# Patient Record
Sex: FEMALE | Race: WHITE
[De-identification: names, ages, dates, MRNs, and addresses within clinical notes are randomized per-mention and may not be internally consistent; named-entity substitution may affect disease eponyms.]

---

## 2017-01-03 ENCOUNTER — HOSPITAL ENCOUNTER (EMERGENCY)
Dept: HOSPITAL 35 - ER | Age: 82
Discharge: HOME | End: 2017-01-03
Payer: COMMERCIAL

## 2017-01-03 VITALS — HEIGHT: 66 IN | BODY MASS INDEX: 22.5 KG/M2 | WEIGHT: 139.99 LBS

## 2017-01-03 VITALS — DIASTOLIC BLOOD PRESSURE: 71 MMHG | SYSTOLIC BLOOD PRESSURE: 137 MMHG

## 2017-01-03 DIAGNOSIS — Z88.6: ICD-10-CM

## 2017-01-03 DIAGNOSIS — F41.9: ICD-10-CM

## 2017-01-03 DIAGNOSIS — I26.99: ICD-10-CM

## 2017-01-03 DIAGNOSIS — Z91.040: ICD-10-CM

## 2017-01-03 DIAGNOSIS — Z90.711: ICD-10-CM

## 2017-01-03 DIAGNOSIS — R06.00: Primary | ICD-10-CM

## 2017-01-03 DIAGNOSIS — I10: ICD-10-CM

## 2017-01-03 DIAGNOSIS — Z90.49: ICD-10-CM

## 2017-01-03 DIAGNOSIS — Z91.041: ICD-10-CM

## 2017-01-03 DIAGNOSIS — Z88.1: ICD-10-CM

## 2017-01-03 DIAGNOSIS — E03.9: ICD-10-CM

## 2017-01-03 LAB
ANION GAP SERPL CALC-SCNC: 8 MMOL/L (ref 7–16)
BASOPHILS NFR BLD AUTO: 2 % (ref 0–2)
BUN SERPL-MCNC: 15 MG/DL (ref 7–18)
BURR CELLS BLD QL SMEAR: (no result)
CALCIUM SERPL-MCNC: 8.8 MG/DL (ref 8.5–10.1)
CHLORIDE SERPL-SCNC: 109 MMOL/L (ref 98–107)
CO2 SERPL-SCNC: 29 MMOL/L (ref 21–32)
CREAT SERPL-MCNC: 0.7 MG/DL (ref 0.6–1.3)
EOSINOPHIL NFR BLD: 2 % (ref 0–3)
ERYTHROCYTE [DISTWIDTH] IN BLOOD BY AUTOMATED COUNT: 12.9 % (ref 10.5–14.5)
GLUCOSE SERPL-MCNC: 111 MG/DL (ref 70–99)
GRANULOCYTES NFR BLD MANUAL: 50 % (ref 36–66)
HCT VFR BLD CALC: 41 % (ref 37–47)
HGB BLD-MCNC: 13.7 GM/DL (ref 12–15)
LG PLATELETS BLD QL SMEAR: (no result)
LYMPHOCYTES NFR BLD AUTO: 37 % (ref 24–44)
MANUAL DIFFERENTIAL PERFORMED BLD QL: YES
MCH RBC QN AUTO: 28.3 PG (ref 26–34)
MCHC RBC AUTO-ENTMCNC: 33.3 % (ref 28–37)
MCV RBC: 85.1 FL (ref 80–100)
MONOCYTES NFR BLD: 9 % (ref 1–8)
NEUTROPHILS # BLD: 3.3 THOU/UL (ref 1.4–8.2)
NT-PRO BRAIN NAT PEPTIDE: 221 PG/ML (ref ?–300)
OVALOCYTES BLD QL SMEAR: (no result)
PLATELET # BLD: 286 THOU/UL (ref 150–400)
POTASSIUM SERPL-SCNC: 3.6 MMOL/L (ref 3.5–5.1)
RBC # BLD AUTO: 4.82 MIL/UL (ref 4.2–5)
SODIUM SERPL-SCNC: 146 MMOL/L (ref 136–145)
TOTAL CELL COUNT: 100
TROPONIN I SERPL-MCNC: < 0.04 NG/ML
WBC # BLD AUTO: 6.6 THOU/UL (ref 4–11)

## 2017-01-09 ENCOUNTER — HOSPITAL ENCOUNTER (OUTPATIENT)
Dept: HOSPITAL 61 - PCVCCLINIC | Age: 82
End: 2017-01-09
Attending: INTERNAL MEDICINE
Payer: MEDICARE

## 2017-01-09 DIAGNOSIS — I10: Primary | ICD-10-CM

## 2017-01-09 DIAGNOSIS — E78.5: ICD-10-CM

## 2017-01-09 DIAGNOSIS — F41.9: ICD-10-CM

## 2017-01-09 DIAGNOSIS — I49.3: ICD-10-CM

## 2017-01-09 DIAGNOSIS — I49.1: ICD-10-CM

## 2017-01-09 PROCEDURE — 93005 ELECTROCARDIOGRAM TRACING: CPT

## 2017-01-09 PROCEDURE — 80061 LIPID PANEL: CPT

## 2017-01-09 PROCEDURE — G0463 HOSPITAL OUTPT CLINIC VISIT: HCPCS

## 2017-02-10 ENCOUNTER — HOSPITAL ENCOUNTER (OUTPATIENT)
Dept: HOSPITAL 61 - PCVCIMAG | Age: 82
Discharge: HOME | End: 2017-02-10
Attending: INTERNAL MEDICINE
Payer: MEDICARE

## 2017-02-10 DIAGNOSIS — I08.3: Primary | ICD-10-CM

## 2017-02-10 DIAGNOSIS — R06.00: ICD-10-CM

## 2017-02-10 DIAGNOSIS — R00.2: ICD-10-CM

## 2017-02-10 PROCEDURE — 93306 TTE W/DOPPLER COMPLETE: CPT

## 2017-02-21 ENCOUNTER — HOSPITAL ENCOUNTER (OUTPATIENT)
Dept: HOSPITAL 61 - PCVCCLINIC | Age: 82
Discharge: HOME | End: 2017-02-21
Attending: INTERNAL MEDICINE
Payer: MEDICARE

## 2017-02-21 DIAGNOSIS — I49.3: ICD-10-CM

## 2017-02-21 DIAGNOSIS — I10: Primary | ICD-10-CM

## 2017-02-21 DIAGNOSIS — F41.9: ICD-10-CM

## 2017-02-21 DIAGNOSIS — E78.5: ICD-10-CM

## 2017-02-21 DIAGNOSIS — I49.1: ICD-10-CM

## 2017-02-21 PROCEDURE — G0463 HOSPITAL OUTPT CLINIC VISIT: HCPCS

## 2017-02-21 PROCEDURE — 93005 ELECTROCARDIOGRAM TRACING: CPT

## 2017-04-20 ENCOUNTER — HOSPITAL ENCOUNTER (OUTPATIENT)
Dept: HOSPITAL 61 - PCVCCLINIC | Age: 82
Discharge: HOME | End: 2017-04-20
Attending: INTERNAL MEDICINE
Payer: MEDICARE

## 2017-04-20 DIAGNOSIS — F41.9: ICD-10-CM

## 2017-04-20 DIAGNOSIS — R42: ICD-10-CM

## 2017-04-20 DIAGNOSIS — R00.2: ICD-10-CM

## 2017-04-20 DIAGNOSIS — I10: Primary | ICD-10-CM

## 2017-04-20 DIAGNOSIS — I49.3: ICD-10-CM

## 2017-04-20 DIAGNOSIS — E78.5: ICD-10-CM

## 2017-04-20 PROCEDURE — 93005 ELECTROCARDIOGRAM TRACING: CPT

## 2017-04-20 PROCEDURE — G0463 HOSPITAL OUTPT CLINIC VISIT: HCPCS

## 2017-10-04 ENCOUNTER — HOSPITAL ENCOUNTER (OUTPATIENT)
Dept: HOSPITAL 61 - PCVCCLINIC | Age: 82
Discharge: HOME | End: 2017-10-04
Attending: INTERNAL MEDICINE
Payer: MEDICARE

## 2017-10-04 DIAGNOSIS — Z79.899: ICD-10-CM

## 2017-10-04 DIAGNOSIS — F41.1: ICD-10-CM

## 2017-10-04 DIAGNOSIS — Z90.49: ICD-10-CM

## 2017-10-04 DIAGNOSIS — Z88.8: ICD-10-CM

## 2017-10-04 DIAGNOSIS — I49.1: Primary | ICD-10-CM

## 2017-10-04 DIAGNOSIS — E78.2: ICD-10-CM

## 2017-10-04 DIAGNOSIS — I49.3: ICD-10-CM

## 2017-10-04 DIAGNOSIS — I10: ICD-10-CM

## 2017-10-04 DIAGNOSIS — Z90.710: ICD-10-CM

## 2017-10-04 PROCEDURE — G0463 HOSPITAL OUTPT CLINIC VISIT: HCPCS

## 2017-10-04 PROCEDURE — 80061 LIPID PANEL: CPT

## 2017-10-04 PROCEDURE — 93005 ELECTROCARDIOGRAM TRACING: CPT

## 2018-02-19 ENCOUNTER — HOSPITAL ENCOUNTER (OUTPATIENT)
Dept: HOSPITAL 61 - PCVCCLINIC | Age: 83
Discharge: HOME | End: 2018-02-19
Attending: INTERNAL MEDICINE
Payer: MEDICARE

## 2018-02-19 DIAGNOSIS — I49.3: ICD-10-CM

## 2018-02-19 DIAGNOSIS — E78.2: ICD-10-CM

## 2018-02-19 DIAGNOSIS — Z79.899: ICD-10-CM

## 2018-02-19 DIAGNOSIS — I49.1: ICD-10-CM

## 2018-02-19 DIAGNOSIS — F41.1: ICD-10-CM

## 2018-02-19 DIAGNOSIS — I10: Primary | ICD-10-CM

## 2018-02-19 PROCEDURE — 93005 ELECTROCARDIOGRAM TRACING: CPT

## 2018-08-08 ENCOUNTER — HOSPITAL ENCOUNTER (OUTPATIENT)
Dept: HOSPITAL 61 - PCVCCLINIC | Age: 83
Discharge: HOME | End: 2018-08-08
Attending: INTERNAL MEDICINE
Payer: MEDICARE

## 2018-08-08 DIAGNOSIS — F41.1: ICD-10-CM

## 2018-08-08 DIAGNOSIS — E78.5: ICD-10-CM

## 2018-08-08 DIAGNOSIS — I10: Primary | ICD-10-CM

## 2018-08-08 DIAGNOSIS — I49.3: ICD-10-CM

## 2018-08-08 PROCEDURE — 80061 LIPID PANEL: CPT

## 2018-08-08 PROCEDURE — 93005 ELECTROCARDIOGRAM TRACING: CPT

## 2019-01-07 ENCOUNTER — HOSPITAL ENCOUNTER (OUTPATIENT)
Dept: HOSPITAL 61 - PCVCCLINIC | Age: 84
Discharge: HOME | End: 2019-01-07
Attending: INTERNAL MEDICINE
Payer: MEDICARE

## 2019-01-07 DIAGNOSIS — Z79.899: ICD-10-CM

## 2019-01-07 DIAGNOSIS — F41.1: ICD-10-CM

## 2019-01-07 DIAGNOSIS — I49.3: ICD-10-CM

## 2019-01-07 DIAGNOSIS — E78.5: ICD-10-CM

## 2019-01-07 DIAGNOSIS — I10: Primary | ICD-10-CM

## 2019-01-07 DIAGNOSIS — Z88.8: ICD-10-CM

## 2019-01-07 PROCEDURE — 36415 COLL VENOUS BLD VENIPUNCTURE: CPT

## 2019-01-07 PROCEDURE — G0463 HOSPITAL OUTPT CLINIC VISIT: HCPCS

## 2019-01-07 PROCEDURE — 93005 ELECTROCARDIOGRAM TRACING: CPT

## 2019-01-07 PROCEDURE — 80061 LIPID PANEL: CPT

## 2019-02-05 ENCOUNTER — HOSPITAL ENCOUNTER (OUTPATIENT)
Dept: HOSPITAL 61 - PCVCIMAG | Age: 84
Discharge: HOME | End: 2019-02-05
Attending: INTERNAL MEDICINE
Payer: MEDICARE

## 2019-02-05 DIAGNOSIS — I49.3: ICD-10-CM

## 2019-02-05 DIAGNOSIS — I10: ICD-10-CM

## 2019-02-05 DIAGNOSIS — I08.3: Primary | ICD-10-CM

## 2019-02-05 DIAGNOSIS — E78.2: ICD-10-CM

## 2019-02-05 PROCEDURE — 93306 TTE W/DOPPLER COMPLETE: CPT

## 2019-02-05 NOTE — PCVCIMAG
--------------- APPROVED REPORT --------------





Study performed:  2019 09:43:46



EXAM: Comprehensive 2D, Doppler, and color-flow 

Echocardiogram

Patient Location: Echo lab

Status:  routine



BSA:         1.72

HR: 71 bpmBP:          130/76 mmHg

Rhythm: NSR



Other Information 

Study Quality: Adequate



Risk Factors: 

Cardiac Risk Factors:  HTN, Hyperlipidemia



Indications

PVCs



2D Dimensions

IVSd:  8.53 (7-11mm)

LVDd:  42.74 mm

PWd:  7.75 (7-11mm)

LVDs:  31.40 (25-40mm)

Left Atrium:  34.60 (27-40mm)

Aortic Root:  28.56 mm

LV Single Plane 4CH:  64.61 %

LV Single Plane 2CH:  61.85 %

Biplane EF:  64.1 %



Volumes

Left Atrial Volume (Systole)

Single Plane 4CH:  38.59 mLSingle Plane 2CH:  36.93 mL

LA ESV Index:  24.00 mL/m2



Aortic Valve

AoV Peak Tan.:  1.29 m/s

AO Peak Gr.:  6.66 mmHgLVOT Max P.02 mmHg

LVOT Max V:  1.12 m/s



Mitral Valve

E/A Ratio:  1.3

MV Decel. Time:  197.96 ms

MV E Max Tan.:  0.64 m/s

MV A Tan.:  0.49 m/s

MV PHT:  57.41 ms

IVRT:  107.27 ms



Pulmonary Valve

PV Peak Tan.:  0.86 m/sPV Peak Gr.:  2.99 mmHg



Pulmonary Vein

P Vein S:    0.19 m/sP Vein A:  0.29 m/s

P Vein D:   0.29 m/sP Vein A Dur.:  90.0 msec

P Vein S/D Ratio:  0.66



Tricuspid Valve

TR Peak Tan.:  2.49 m/s

TR Peak Gr.:  24.77 mmHg

TV Vmax:  0.45 m/s



Left Ventricle

The left ventricle is normal size. There is normal LV segmental wall 

motion. There is normal left ventricular wall thickness. Left 

ventricular systolic function is normal. The left ventricular 

ejection fraction is within the normal range. LVEF is 60-65%. Grade I 

- abnormal relaxation pattern.



Right Ventricle

The right ventricle is normal size. The right ventricular systolic 

function is normal.



Atria

The left atrium size is normal. The right atrium size is 

normal.



Aortic Valve

The aortic valve is mildly sclerotic. Mild aortic regurgitation. 

There is no aortic valvular stenosis.



Mitral Valve

The mitral valve is normal in structure. Mild to moderate mitral 

regurgitation. No evidence of mitral valve stenosis.



Tricuspid Valve

The tricuspid valve is normal in structure. Mild tricuspid 

regurgitation with PAP of 32 mmHg.



Pulmonic Valve

The pulmonary valve is normal in structure. Trace pulmonic 

regurgitation.



Great Vessels

The aortic root is normal in size. IVC is normal in size and 

collapses >50% with inspiration.



Pericardium

There is no pericardial effusion. There is no pleural 

effusion.



<Conclusion>

Left ventricular systolic function is normal.

There is normal LV segmental wall motion.

LVEF is 60-65%. Mild diastolic dysfunction

The aortic valve is mildly sclerotic. Mild aortic regurgitation, no 

stenosis

The mitral valve is normal in structure. Mild to moderate mitral 

regurgitation.

Mild tricuspid regurgitation with pulmonary artery pressure of 32 

mmHg.

There is no pericardial effusion.

## 2019-03-21 ENCOUNTER — HOSPITAL ENCOUNTER (OUTPATIENT)
Dept: HOSPITAL 61 - PCVCCLINIC | Age: 84
Discharge: HOME | End: 2019-03-21
Attending: INTERNAL MEDICINE
Payer: MEDICARE

## 2019-03-21 DIAGNOSIS — E78.5: ICD-10-CM

## 2019-03-21 DIAGNOSIS — Z88.1: ICD-10-CM

## 2019-03-21 DIAGNOSIS — I49.3: ICD-10-CM

## 2019-03-21 DIAGNOSIS — Z88.8: ICD-10-CM

## 2019-03-21 DIAGNOSIS — F41.1: ICD-10-CM

## 2019-03-21 DIAGNOSIS — I10: Primary | ICD-10-CM

## 2019-03-21 PROCEDURE — 93005 ELECTROCARDIOGRAM TRACING: CPT

## 2019-03-21 PROCEDURE — G0463 HOSPITAL OUTPT CLINIC VISIT: HCPCS

## 2019-03-21 PROCEDURE — 36415 COLL VENOUS BLD VENIPUNCTURE: CPT
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
## 2019-07-23 NOTE — NUR
ADMITTED FROM ER UNDER 'S CARE. ADMITTED WITH UTI AND WEAKNESS. MED
REC COMPLETED AND HOME MEDS STARTED PER NP ONCALL FOR KIMMY. NO S/S ACUTE
DISTRESS NOTED REPORTED AT THIS TIME. CARE STRANSFERRED TO DAY RN AT THIS
TIME.

## 2019-07-23 NOTE — NUR
Received awake on bed. Due medications given as prescribed- able to swallow
tablet w/o difficulty. On room air. A+O. On room air. With NS at 75cc/hr,
infusing well at L FA. Falls risk- falls bundle in place. Pt assisted in ADLs.
Able to go to the bathroom with walker, gait belt and on stand by assist. Pt
seen by Dr Tijerina this morning. Pt on 2 laxatives, open her bowels 2x- witheld
laxatives as pt had soft stools, 2 episodes already since this morning.

## 2019-07-24 NOTE — NUR
Received awake on bed. Due medications given as prescribed- able to swallow
tablets w/o difficulty. A+Ox2- oriented to person and place, confused and
forgetful. Up with walker, gaitbelt and standby assist. With SL at L FA-
intact and flushing well. Falls risk- falls bundle in place. Pt seen by Dr Smith- pt mentioned that she wants to go home [facility]- Dr Smith informed;
a/w discharge orders. CM informed, will set up transport once with discharge
orders.

## 2019-07-24 NOTE — NUR
CARE TEAM INDICATED THAT PT IS MEDICALLY STABLE TO DC HOME THIS DAY. PT
INDICATED THAT SHE NEEDS TRANSPORT HOME. PT INDICATED SHE ISN'T COMFORTABLE
USING A CAB. CM REQUESTED AND RECIEVED PERMISSION TO ARRANGE TRANSPORT THROUGH
[facility]. TRANSPORT IS ARRANGED AND THEY ARRIVED AROUND
1:54PM. NO OTHER CM INTERVENTION INDICATED. CASE CLOSED.

## 2019-07-29 ENCOUNTER — HOSPITAL ENCOUNTER (OUTPATIENT)
Dept: HOSPITAL 61 - PCVCCLINIC | Age: 84
Discharge: HOME | End: 2019-07-29
Attending: INTERNAL MEDICINE
Payer: MEDICARE

## 2019-07-29 DIAGNOSIS — Z88.6: ICD-10-CM

## 2019-07-29 DIAGNOSIS — Z91.040: ICD-10-CM

## 2019-07-29 DIAGNOSIS — Z88.0: ICD-10-CM

## 2019-07-29 DIAGNOSIS — I49.3: ICD-10-CM

## 2019-07-29 DIAGNOSIS — E78.5: ICD-10-CM

## 2019-07-29 DIAGNOSIS — F41.1: ICD-10-CM

## 2019-07-29 DIAGNOSIS — I10: Primary | ICD-10-CM

## 2019-07-29 DIAGNOSIS — I65.23: ICD-10-CM

## 2019-07-29 PROCEDURE — 80061 LIPID PANEL: CPT

## 2019-07-29 PROCEDURE — 93005 ELECTROCARDIOGRAM TRACING: CPT

## 2019-07-29 PROCEDURE — G0463 HOSPITAL OUTPT CLINIC VISIT: HCPCS

## 2019-07-29 PROCEDURE — 36415 COLL VENOUS BLD VENIPUNCTURE: CPT

## 2020-11-09 NOTE — EKG
[facility]
[address]
[place]   [address]                     ELECTROCARDIOGRAM REPORT      
_______________________________________________________________________________
 
Name:       PO ROSAS           Room #:                     REG [facility]#:      [id number]                       Account #:      [id number]  
Admission:  20    Attend Phys:                          
Discharge:              Date of Birth:  [birth year]  
                                                          Report #: [id number]
                                                                    [id number]
_______________________________________________________________________________
THIS REPORT FOR:  
 
cc:  FAM - Family physician unknown
     FAM - Family physician unknown
     Gurpreet Lombardi MD [facility]                                         ~
THIS REPORT FOR:   //name//                          
 
                         [facility] ED
                                       
Test Date:    2020               Test Time:    11:37:30
Pat Name:     PO ROSAS            Department:   
Patient ID:   [id number]            Room:          
Gender:       F                        Technician:   REFUGIO
:          1933               Requested By: Salina John
Order Number: [id number] MD:   Gurpreet Lombardi
                                 Measurements
Intervals                              Axis          
Rate:         88                       P:            59
IL:           157                      QRS:          59
QRSD:         89                       T:            41
QT:           385                                    
QTc:          466                                    
                           Interpretive Statements
Sinus rhythm
Compared to ECG 2019 22:20:11
No significant changes
Electronically Signed On 2020 14:11:24 CST by Gurpreet Lombardi
[email or web address]
 
 
 
 
 
 
 
 
 
 
 
 
 
 
 
 
  <ELECTRONICALLY SIGNED>
   By: Gurpreet Lombardi MD, FACC    
  20     1411
D: 11/1137                           _____________________________________
T: 20                           Gurpreet Lombardi MD, FACC      /EPI

## 2020-11-10 NOTE — NUR
PATIENT ADMITTED TO [facility] AT 0704 TO ROOM 527-A. PATIENT ADMITTED FOR MAJOR
NEURO COGNITIVE DISORDER WITH BEHAVIORS
 
PATIENT ARRIVED IN WC AND
TRANSFERED TO BED. PATIENT ALERT, CALM AND COOPERATIVE. PATIENT ANSWERS
QUESTIONS APPROPRIATELY. NO N/O PAIN, LS CLEAR, BS ACTIVE. PATIENT CONFUSED
AND DOES NOT KNOW WHY SHE IS HER. PATIENT ORIENTED TO SELF AND IS AWARE THAT
SHE IS IN THE HOSPITAL. PATIENT TO BATHROOM USING WALKER, WITH ASSIST
X1. PATIENT VOIDED X1. SWELLING TO ANUS NOTED, PATIENT C/O DISCOMFORT TO
BOTTOM. BRRIER CREAM APPLIED. PATIENT OUT TO DAYROOM FOR BREAKFAST, WITH
PILLOW UNDER BOTTOM. PATIENT % OF MEAL. MEDICATION TAKEN WHOLE WITHOUT
DIFFICULTY. PATIENT PRESENT IN GROUP AT THIS TIME.
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
## 2020-11-10 NOTE — NUR
ABISAI and Dr. Mesa attempted to contact Pt's DPOA, Lashay Prado [phone].
Dr. mesa left a message for a call back

## 2020-11-10 NOTE — NUR
PATIENT SITTING IN DAYROOM MOST OF THE DAY. AT 1500 PATIENT AMB TO ROOM AND TO
TOILET. PATIENT NOTED WITH STOOL ON PANTS AND IN BRIEF. PATIENT UNAWARE OF BM
AS STOOL RUNS OUT. PATIENT ON TOILET AND LOOSE STOOL RUNNING OUT AS
PATIENT STANDS ABOVE TOILET WITH PATIENT MOANING. PATIENT TO BED AND CLEANED
UP WHILE OBSERVING RECTUM AREA. REDNESS AND SWELLING NOTED.
HEMORRHOIDAL OINTMENT APPLIED WITH PATIENT
LAYING ON SIDE. PATIENT REMAINS IN BED TO REST BOTTOM.
1615 PATIENT AMB TO DAYROOM, SITS IN CHAIR WITH PILLOW UNDER BOTTOM.
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
## 2020-11-11 NOTE — NUR
1045 BLADDER SCAN COMPLETED ON PATIENT WITH 866 ML ON FIRST SCAN AND 1366 ML
ON SECOND SCAN. 16 FR. MAI INSERTED VIA STERILE TECH. WITH 1950 OUT OF GERTRUDIS
COLOR URINE AND A ODOR NOTED. URINE COLLECTED FOR UA. BS CHECKED WITH HYPO
ACTIVE BS NOTED. PATIENT REMAINES IN BED.
KASHIF DELGADO COMPLETED. WRITER RECIEVED A CALL FROM RAYMOND COLLIER, PHONE
CONSENT RECIEVED. EDITH COLLIER PHONE [phone] OFFICE/ [phone] CELL
1200 PATIENT C/O OF FEELING UNEASY AS SHE STATES "I HAVE A
BAD FEELING AS IF SOMETHING IS GOING TO HAPPEN AND I DO NOT WANT TO BE ALONE".
SHE ALSO STATES "I HAVE THIS INNER FEELING THAT I HAVE LEARNED TO BE AWARE OF
BECAUSE IF I DO NOT LISTEN TO THEM THEN SOMEONE CAN DIE AS IT HAS HAPPENED
BEFORE". PATIENT SAYS THIS FEELING IS FROM WITHIN AND HARD TO EXPLAIN. PATIENT
DOES NOT WANT TO BE ALONE AND WRITER ASKED PATIENT TO COME TO DAYROOM BUT
PATIENT REFUSES TO DO SO. WILL CONTINUE TO OBSERVE

## 2020-11-11 NOTE — NUR
ASSUMED CARE OF PATIENT, PATIENT IN DAYROOM SITTING WITH PEERS. PATIENT HAD
FEW BITES OF BREAKFAST STATING "I CAN NOT SWALLOW" AFTER SOME ENCOURAGEMENT
PATIENT REFUSES TO EAT. MEDICATION TAKEN WHOLE WITH ENCOURAGEMENT. PATIENT TO
BATHROOM WITH NO RESULTS. PRESENT IN GROUP AT THIS TIME.

## 2020-11-11 NOTE — NUR
6048 PATIENT CLEANED UP AND SITS UP IN BED FOR DINNER. PATIENT GIVEN REGULAR
DIET PER DR ORDER AND PATIENT REQUEST. WRITER STEPS OUT OF ROOM TO GET A
DRINK, WHEN RETURNED PATIENT C/O CHEST PAIN, NAUSEA AND SPIT UP SCANT AMT IN
NAPKIN. CONTINUES TO C/O CHEST PAIN. VS BP-137/72 P-60-68, RESP- 16 O2 SATS
99% ON RA. DR WERNER BASILIO AND DR OHARA. ORDERS RECIEVED

## 2020-11-11 NOTE — NUR
PATIENT CLEANED UP X3 IN A 10 MIN PERIOD. LOOSE STOOL POORING OUT WITH ANY
MOVEMENT FROM PATIENT. PATIENT CONTINUES TO C/O CHEST PAIN AND STATES A
DECREASE IN PRESSURE. ORDERS RECIEVED FROM DR OHARA.

## 2020-11-11 NOTE — NUR
PATIENT HAD A LARGE AMT OF BROWN LOOSE STOOL IN TOILET AND ON FLOOR. PATIENT
CLEANED UP AND BACK TO BED. PATIENT VOICED EMBARRASSEMENT AND STATES "I CAN
NOT BELIEVE THIS, I SHOULD NOT BE HERE FOR THIS".
PATIENT REFUSES LACTULOSE AT THIS TIME.

## 2020-11-11 NOTE — NUR
11-10-20 CARE TRANSFERRED AT 1900 OBSERVED PT LEFT SIDE LYING IN BED RESTING
WITH EYES CLOSED. 1930 PT AAOX3, VSS, RR EVEN AND NONLABORED, PT REPORTS PAIN
IN RECTUM AREA AND SCALES 4 ON 0-10 SCALE. PT DENIES SI/HI. PT PRESENTS CALM
AND COOPERATIVE BUT NOTED CONFUSION WHEN PT STATED THAT SHE IS 65 YEARS OLD.
PT PLEASANT AND EASILY CONVERSATION ABOUT MULTI OF TOPICS. DURING MEDICATION
ADMIN PT HAD NO DIFFICULTIES. LATER ADJUSTED BED FOR PT COMFORT, LOCKED AND
ALARM SET. ZERO S/S OF ACUTE DISTRESS NOTED, PT WILL CONTINUE TO BE MONITOR
PER [facility] PROTOCOL.

## 2020-11-12 NOTE — NUR
Care of patient assumed at 1915: Patient laying in bed at start of shift.
Awake, alert and oriented to person only.  Patient confused and forgetful.
Patient able to state that she is not in her apartment but is unable to
identify further current location.  Unable to state date.  States that she is
here for an appointment with Dr. Blue.  Patient calm, pleasant and
cooperative.  Denies SI/HI/AH/VH.  No delusional or paranoia behaviors
observed.  Denies anxiety and depression.  No aggression or agitation
observed.  Taveras intact, to DD, misbah colored urine.  Fluids encouraged with
each encounter.  Hesistant on drinking any fluids offered.  Takes small sips
at a time.  Denies pain or discomfort.  Incontinent of loose stool.
Cooperative with chioma care and linen change.  Took HS medication whole without
difficulty.  Resting quietly in bed at this time.

## 2020-11-12 NOTE — NUR
1030 RESUMMED CARE FROM OVERNIGHT SHIFT THUS AM, PATIENT CALM QUIET SITTING IN
DAY ROOM. PATIENT ATE LIQUID BREAKFAST TOOK MEDICATION WITHOUT INCIDENCE.
PATIENTS ABDOMEN SOFT FLAT BOWEL SOUNDS PRESENT LUNGS CLEAR PATIENT HAS
CATHETER IN PLACE. PATIENT HAS LARGE HEMMOROIDS IN BUTTOCK WHICH WE USE
PREPARATION EIGHT FOR COMFORT. PATIENT DENIES SI/HI/AH/VH AT PRESENT PATIENT
ORIENTED TO SELF PLACE. PATIENT COOPERATIVE CALM WILL CONTINUE TO MONITOR
PATIENT FOR SAFETY AND BEHAVIORS.

## 2020-11-12 NOTE — NUR
11-11-20 CARE TRANSFERRED 1900. 1950 PT AAOX2, VSS, RR EVEN AND NONLABORED ON
RA. PT DENIES SI/HI. PT WAS CLEAN UP FROM LARGE LOOSE BM, PT REPORTS PAIN AT A
4 ON 0-10 SCALE, BUT DENIES WANTING ANY TYLENOL, HCP CONSULTED AND ALL STOOL
PROMOTORS ON HOLD. PT HAS HAD 5 SMALL BM ALL LOOSE. OF NOTE, PT HAS BEEN CLEAN
UP WITH WARM SOAP AND WATER AND BARRIER CREAM APPLIED. LATER PT REPORTED
FEELING NASAUTED AND ZOFRAN SL 4MG TAB ADMIN. 2ND B/P 124/62, P 64, R 16,
02SAT 98% RA RR EVEN AND NONLABORED ON RA. PT BED WAS ADJUSTED FOR COMFORT.
ZERO S/S OF ACUTE DISTRESS NOTED, PT WILL CONTINUE TO BE MONITOR PER [facility]
PROTOCOL.

## 2020-11-12 NOTE — EKG
[facility]
[address] [place]   [address]                     ELECTROCARDIOGRAM REPORT      
_______________________________________________________________________________
 
Name:       PO ROSAS           Room #:         [facility]      ADM IN  
M.R.#:      [id number]                       Account #:      [id number]  
Admission:  11/10/20    Attend Phys:    Renato Ham DO
Discharge:              Date of Birth:  [birth year]  
                                                          Report #: [id number]
                                                                    [id number]
_______________________________________________________________________________
THIS REPORT FOR:  
 
cc:  FAM - Family physician unknown
     FAM - Family physician unknown
     Gurpreet Lombardi MD [facility]                                         ~
THIS REPORT FOR:   //name//                          
 
                          [facility]
                                       
Test Date:    2020               Test Time:    17:52:29
Pat Name:     PO ROSAS            Department:   
Patient ID:   [id number]            Room:         [facility]
Gender:       F                        Technician:   SONJA
:          1933               Requested By: Renato Ham
Order Number: [id number] MD:   Gurpreet Lombardi
                                 Measurements
Intervals                              Axis          
Rate:         68                       P:            76
SD:           191                      QRS:          55
QRSD:         91                       T:            43
QT:           424                                    
QTc:          451                                    
                           Interpretive Statements
Sinus rhythm
Consider left ventricular hypertrophy
Compared to ECG 2020 11:37:30
No significant changes
Electronically Signed On 2020 12:17:10 CST by Gurpreet Lombardi
[email or web address]
 
 
 
 
 
 
 
 
 
 
 
 
 
 
 
  <ELECTRONICALLY SIGNED>
   By: Gurpreet Lombardi MD, FACC    
  20     1217
D: 20 175                           _____________________________________
T: 20                           Gurpreet Lombardi MD, [facility]      /EPI

## 2020-11-13 NOTE — NUR
PT. APPROACHED THIS RN THIS EVENING.  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
TO BE DISCHARGED SO SHE CAN GO BACK TO HER MOTEL TO RECLAIM A JOB AND HER
ROOM.  DR. OHARA ON THE UNIT AND SPOKE WITH THE PT. ABOUT THIS.

## 2020-11-14 NOTE — NUR
DYSPHORIC MOOD NOTED UPON INITAL ASSESSMENT THIS AM-SITTING IN DAYROOM WITH
FEMALE PEER AND WHEN GREETED BY NURSE BEGINS TO REPORT A LONG LIST OF
COMPLAINTS ABOUT UNIT,FOOD,BED,TV ETC. MULTIPLE SOMATIC COMPLAINTS REPORTING
DURING AM REASSESSMENT INCLUDING BOTH CONSTIPATION AND DIARRHEA AT ONCE-WHEN
REQUESTED CLARIFICATION BECOMES ANGRY STATING "HOW SHOULD I KNOW I WAS
ASLEEP"DENIES SI/HI/SH. REFUSED AM GROUPS REPORTING NOT FEELING WELL-STATING
FEELS SICK TO STOMACH.GAIT STEADY WITHOUT ASSISTIVE DEVICES.

## 2020-11-14 NOTE — NUR
Assumed care of pt @ 1900. Pt calm et cooperative this shift. Took medications
whole without difficulty. Ambulates ad sathish with steady gait. VSWNL. Health
assessment with no abnormalities noted at present time. Denies SI/HI/AVH at
present time. Isolated in room most of shift. Currently resting in bed with
eyes closed. Will continue to monitor per unit protocol.

## 2020-11-15 NOTE — NUR
PT OUT IN DINING ROOM EARLY THIS AM DRINKING COFFEE. PT DENIES ANY PAIN. PT
STATED SHE IS STILL HAVING SOME STOOLS. PT HAS BRIEF ON. PT USES WALKER WITH
STAND-BY ASSIST. PT TOLERATING DIET, NO N/V. PT STATED SHE ISN'T PASSING ANY
GAS OR BELCHING.

## 2020-11-15 NOTE — NUR
Assumed care of pt @ 1900. Pt calm et cooperative this shift. Took medications
whole without difficulty. Ambulates with assistance of walker with somewhat
steady gait. VSWNL. Health assessment with no abnormalities noted at present
time. Denies SI/HI/AVH at present time. Isolated in room most of shift.
Currently resting in bed with eyes closed. Will continue to monitor per unit
protocol.

## 2020-11-16 NOTE — NUR
1430 RESUMMED CARE FROM OVERNIGHT SHIFT THIS AM, PATIENT IN ROOM SLEEPING. I
GOT PATIENT UP AND TOOK HER TO THE DAYROOM FOR BREAKFAST SHE TOOK HER
MEDICATION WITHOUT INCIDENCE. PATIENTS ABDOMEN SOFT FLAT BOWEL SOUNDS PRESENT
LUNG CLEAR. PATIENT LIKES TO STAY IN ROOM AND DOES NOT PARTICIPATE IN GROUPS.
DR OHARA HAS PUT PATIENT ON ROOM LOCKOUT FOR GROUPS, PATIENT WAS UPSET THAT
SHE COULD NOT GO IN HER ROOM. PATIENT DENIES SI/HI/AH/VH AT PRESENT PATIENT
ORIENTED TIMES 3. PATIENT IS CALM AND HAS MINIMAL INTERACTION WITH OTHER
PATIENTS. PATIENT'S HEMMOROIDS IS SHRINKING AND THE PREPARATION H IS WORKING.
WILL CONTINUE TO ENCOURAGE PATIENT TO PARTICIPATE AND SHE IS EATING BETTER.
WILL CONTINUE TO MONITOR PATIENT FOR SAFETY AND BEHAVIORS.

## 2020-11-16 NOTE — NUR
11-15-20 CARE TRANSFERRED 1900. 1940 PT AAOX2, VSS, RR EVEN AND NONLABORED ON
RA, PT DENIES ANY PAIN AND SI/HI. PT PLEASANT BUT EASLIER BECOMES CONFUSED,
VERY COOPERATIVE AND REMAINS CALM. LATER ASSISTED PT DURING BATHROOM, AND PT
STATED "why is this happening to me, I am ready to just go on and die" PT
SHOWS SOME HOPELESSNESS IN THIS AREA, PT WAS VERY RECEPTIVE TO DRINKING MORE
WATER THROUGHOUT AWAKEN HOURS. DURING MEDICATION ADMIN NO DIFFICULTEIS. LATER
ADJUSTED BED FOR PT COMFORT. PT SHOWS ZERO S/S OF ACUTE DISTRESS, PT WILL
CONTINUE TO BE MONTIOR PER [facility] PROTOCOL.

## 2020-11-17 NOTE — NUR
Care assumed of patient at 1915: Patient laying in bed at start of shift,
awake. Patient quite labile, easily agitated. Nurse entered room to introduce
self, patient looked up and stated "don't you have all those stupid questions
to ask me?!".  Nurse informed patient she would be back to speak with her.
Patient turned over then ignored nurse.  Nurse entered room shortly after to
complete assessment and assess vital signs.  When asked about any concerns,
patient stated for nurse to leave as her concern.  No goal reported.  Alert
and oriented to person only.  Unknown if this is accurate or non-compliance.
Denies pain or discomfort.  Denies SI/HI/AH/VH.  Denies anxiety and
depression.  Answers with short "no" answers.  Patient fixated on her bowels
this evening.  Offered HS snack.  Reports she won't be able to eat until she
has a BM.  Later in the evening, nurse responded to bed alarm.  Patient up
without walker.  Became agitated when provided education on using walker.
Continues to refuse use of walker this evening.  Patient did wipe her bum
which showed a smear of BM.  However, patient refused to sit on the toilet.
Asked patient several times to try to sit on the toilet and have a BM.  She
simply states "It's not coming out".  Patient frustrated with chioma care and
linen change.  Patient took HS medication whole without difficulty.  Patient
up a pacing the halls x2 this evening.  Nurse provided walker and walked with
patient.  Patient stated "Are you ever going to leave me alone?".  Education
provided regarding fall risk and safety.  Patient then stated "I guess I will
just go back to my room so you will leave".  Patient assisted back to bed and
is resting quietly at this time.

## 2020-11-17 NOTE — NUR
RT Progress Note- Marivel has not been active in both the milieu and
recreation therapy groups since admission. She isolates to her room, and
despite being on a room lockout, returns to her room via reasons such as "not
feeling good" or needing to use the restroom. Marivel is sharp in tone and has
poor attitude when interacting with recreation staff.

## 2020-11-17 NOTE — NUR
Assumed care of patient this pm shift. Patient in good spirits, calm and
cooperative. Patient takes medications whole with thin fluids. Patient denies
hi/si. Patient ambulates with a walker. Falls precautions in place. Assessment
shows no signs of acute distress. Vital signs stable. Patients affect is
normal. We will continue to monitor per hospital policy.

## 2020-11-17 NOTE — NUR
VISIBLE IN DAYROOM AT MEAL TIMES AND DID SIT IN BACK OF ROOM DUIRING AM RT
GROUP-WHEN PROMPTED TO SIT CLOSER AND PARTICIPATE STATES "I  CAN'T I AM SICK"
OFFERS NO SPECIFIC COMPLAINTS DESPITE QUESTIONS ASKED STATING ONLY "I JUST
FEEL SICK-I NEED HELP GOING TO THE BATHROOM" THIS NURSE WALKED WITH HER TO BR
AND ATTEMPTED TO ASSIST ONTO TOILET PT STATES ANGRILY "I CAN DO I I NEED HELP
GOING TO BATHROOM-TO MAKE IT COME OUT" IRRITABLE DYSPHORIC MOOD-ANGRY FACIAL
EXPRESSION,ABRUPT RESPONSES. BS ACTIVE X 4-ABDOMEN SOFT AND NON-TENDER-IS
DOCUMENTED AS HAVING BM ON SAT 11/14-SEVERAL DIARRHEA STOOLS STOOLS THROUGHOUT
COURSE OF THAT DAY AFTER TAKING LAXATIVE. DISCUSSED WITH  DURING
ROUNDS. GAIT IS STEADY WITH USE OF ROLLER WALKER WHICH SHE OFTEN TIMES FORGETS
TO USE SO REMAINS HIGH FALLS RISK-TOLD THIS RN AT 1700 TODAY "I CAME HERE TO
SEE A FASHION SHOW AND I GOT STUCK HERE"
"

## 2020-11-17 NOTE — NUR
ABISAI attempted to contact with the Pt's DPOA, Lashay Prado, concerning need for AL
memory care. As of this not ABISAI and Dr. Palencia have been unsucessful with
connecting with Mr. Prado. Several phone calls were made to both his cell
phone and office phone.   Voice mails for call backs were also made on
11/16/2020.

## 2020-11-18 NOTE — NUR
ABISAI spoke with Rose, AL mem care coordinator , at [facility] [phone].
Rose informed that she has been in contact with Lashay Prado concerning the
Pt move to AL Memory care.  Rose infomred Pt can they can accept the Pt
possibly Monday 11/23/2020.  Rose rejosesitoeuested clinical notes, be faxed to
[phone].
 
ABISAI faxed clinical notes to [facility]

## 2020-11-18 NOTE — NUR
PT SET BED ALARM ON AND USING WALKER TO AMBULATE OUT IN THE HOLLIDAY. PT STATED
SHE NEEDS TO GO TO THE HOSPITAL. TOLD PT SHE WAS IN THE HOSPITAL, PT STATED
WELL THEY ARE NOT DOING ANYTHING FOR ME AND I DON'T KNOW WHAT TO DO.
ENCOURAGED PT TO HAVE A BM AND SIT, PT DOESN'T SIT BUT 15 SEC ON COMMODE OR
STOOL AND HAS TO GET UP. PT HAD SMALL SMEAR OF STOOL.

## 2020-11-18 NOTE — NUR
ASSISTED PT TO BATHROOM, SHE DIDN'T KNOW IF SHE COULD MAKE IT. PT SAT ON
TOILET, SMEAR OF STOOL IN BRIEF. PT UNABLE TO VOID OR HAVE BM. PT STATED SHE
FELT LIKE HER STOMACH WAS CRAMPING. PALPATED LOWER ABD, FELT FIRM WILL BLADDER
SCAN FOR URINE RETENTION.

## 2020-11-18 NOTE — NUR
BLADDER SCANNED PT IN BED AND GOT 1293ML OF URINE. ASSISTED PT TO BATHROOM. PT
UNABLE TO SIT FOR A PERIOD OF TIME DUE TO PAIN, TRIED TO RUN WATER IN SINK TO
ASSIST WITH VOIDING WITH NO SUCCESS.

## 2020-11-18 NOTE — NUR
PT SITTING IN DINING ROOM. PT TOOK MEDS. WITH SOME QUESTION AND ASKING IF SHE
NEEDED TO TAKE ALL THESE MEDS. PT LUNGS CLEAR. PT UP WITH WALKER FAITHFULY. PT
SMILES AT STAFF, NO COMPLAINTS.

## 2020-11-18 NOTE — NUR
PT OUT TO EAT DINNER, PUT A PILLOW UNDER HER BOTTOM. PT STATED THAT HELPED. PT
HAVING ISSUES WITH BM DUE TO HEMMORROIDS PAIN.

## 2020-11-18 NOTE — NUR
STRAIGHT CATH WITH A 15 Armenian CATH AND GOT OUT 1000ML OF TEA COLOR URINE THAT
WAS CLEAR. PT STATED SHE FELT BETTER.

## 2020-11-18 NOTE — NUR
PT BLADDER SCANNED AND GOT 1000ML OF URINE IN BLADDER. OBTAINED ORDER FOR
MAI CATH. PLACED 16 Spanish MAI TO DD WITH CLEAR YELLOW URINE OUT INTO THE
BAG.

## 2020-11-19 NOTE — NUR
11-18-20 CARE TRANSFERRED 1900. 1940 PT AAOX2, VSS, RR EVEN AN NONLABORED ON
RA, PT DENIES ANY PAIN AND SI/H. PT CATH INTACT, 1000ML OF YELLOW URINE
EMPTIED, NO SEDIMENT OR ODOR NOTED. PT PRESENTS PLEASANT, CALM AND
COOPERATIVE. DURING MEDICATION ADMIN PT HAD NO DIFFICULTIES. LATER ASSISTED PT
WITH BED ADJUSTMENT FOR COMFORT. THEN 11-19-20 0550 100ML OF YELLOW URINE
EMPTIED, NO SEDIMENT OR ODOR NOTED. ZERO S/S OF ACUTE DISTRESS NOTED, PT WILL
CONTINUE TO BE MONITOR PER [facility].

## 2020-11-19 NOTE — NUR
1315- after lunch Pt c/o nausea and some emnesis but flushed it before staff
could witness it.Zofran po taken with good results. Pt in bed most of pm but
did attend 3pm group amb with walker. barnes to dd with misbah urine. Pt had KUB
port on unit. is able to eat 100% of supper.

## 2020-11-19 NOTE — NUR
0700AM-ACCEPT CARE OF PT FROM 7P-7A SHIFT. PT IS ALERTX2. ASSISTED UP TO DRESS
AND COME TO BREAKFAST. FEEDS SELF SLOWLY. SMILES OCCASIONALLY . HAS A MAI TO
DD WITH GERTRUDIS URINE.PT TAKES HER MEDS WHOLE WITHOUT DIFFICULTY.DENIES PAIN AT
THIS TIME.AMBULATES IN HOLLIDAY SLOWLY AFTER BREAKFAST.

## 2020-11-20 NOTE — NUR
ABSIAI contacted [facility] concerning Pt's having a barnes.  Judy stated it may be
and issue and wanted to contact the DON concerning the matter.  ABISAI informed
the barnes was placed due to retention and would need otpt follow up with a
eurologist.
 
 
Later ABISAI recieved a call back on the matter.  Judy informed they may not be
able to take the Pt back. Judy wanted
to know if the Pt could manage the barnes
hersef and How long would the it be it.  ABISAI did not have thae answers and
contacted Dr. Bryan  to get questions answered.  Dr. Bryan requested to speak
with [facility] DON.  ABISAI passed this information to Judy along with Dr. Bryan's cell phone number.
 
Judy again called back stating [facility] would accept the Pt back.
 
Pt scheduled for discharge 11/23/2020 @ 1400.  [facility] will provide
transportation.

## 2020-11-20 NOTE — NUR
Assumed care of pt @ 1900. Pt calm et cooperative this shift. Took medications
whole without difficulty. Ambulates the halls with assistance of walker with
steady gait. VSWNL. health assessment with no abnormalities other than
previously noted. Denies SI/HI/AVH at present time. Isolated in room most of
shift. Currently resting in bed with eyes closed. Will continue to monitor per
unit protocol.

## 2020-11-20 NOTE — NUR
0700 ASSUMED CARE OF PATIENT, PATIENT IN BED AT THAT TIME. PATIENT OUT TO
DAYROOM FOR BREAKFAST. PATIENT JAY JAY AND COOPERATIVE. MEDICATION TAKEN WHOLE
WITHOUT DIFFICULTY. MAI CATH INTACT WITH YELLOW URINE NOTED TO TUBING.
PATIENT RETURNS TO ROOM AFTER EVERY MEAL TO REST. DENIES SI/HI. NO
HALLUCINATIONS NOTED.

## 2020-11-21 NOTE — NUR
Assumed care of patient this pm shift. Patient in good spirits, calm and
cooperative. Alert and oriented x3. Patients affect is happy, smiling.
Patients assessment shows no signs of acute distress. Patient has barnes in
place, yellow urine in the bag. Patient takes medications whole with thin
fluids. Patient ambulates via walker. Falls precautions in place. Patient had
a bowel movement this evening. We will continue to monitor per policy.

## 2020-11-21 NOTE — NUR
0700 ASSUMED CARE OF PATIENT, PATIENT IN BED SLEEPING AT THAT TIME. OUT TO
DAYROOM FOR BREAKFAST AT 0740. 0900 PATIENT IN ROOM RESTING IN BED.
MEDICATIONS GIVEN WHOLE AFTER C/O TO MANY PILLS AND HOW SHE SHOULD NOT TAKE
ALL AT ONCE. MAI CATH INTACT WITH YELLOW URINE AND SEDIMENT NOTED. PATIENT
DRINKING FLUIDS WELL. VS- /61, P 87, RESP 18, TEMP 97.9, O2 SATS 95%.
HEMORRHOID OINTMENT APPLIED, SWELLING STILL PRESENT. PATIENT DOES HAVE
DIFFICULTY SITTING AND STANDING DUE TO HEMMORROIDS. AMB WELL WITH WALKER.
1000 COVID TEST COMPLETED AND TO LAB. WILL CONTINUE TO OBSERVE

## 2020-11-22 NOTE — NUR
PT NEEDED ASSISTANCE WITH CHANGING HER BRIEF, PT HAS MORE FORMED SOFT STOOL IN
BRIEF AND A SMALL STOOL IN TOILET. PT STATED SEE THATS ALL I DID. PT ALSO
STATED SHE WAS TRYING TO URINATE, TOLD PT THAT SHE HAS A MAI AND THAT IT IS
DRAINING URINE, CLEANED AROUND TUBE TWICE TODAY.

## 2020-11-22 NOTE — NUR
Assumed care of pt @ 1900. Pt calm et cooperative with josesito annejessica this
shift. Took medications whole without difficulty. Ambulates with assistance of
walker with steady gait. VSWNL. Health assessment with no abnormalities other
than previously noted. Denies SI/HI/AVH at present time. Pt had a long
conversation with this nurse about why she is currently in this facility. Pt
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 Pt
states that someone contacted her regarding putting on a show here and she
made an appointment to meet with them but when she met with them, they stated
that they didn't have the money for the show. Pt then states that she
contracted "someone's sickness" and ended up staying here. Pt also c/o "poor
management" here and that we need to have someone investigating the waiting
room to make sure the people are taken care of better. Pt currently resting in
bed with eyes closed. Will continue to monitor per unit protocol.

## 2020-11-22 NOTE — NUR
SPOKE WITH DR. CALDERA, HE SAYS TO KEEP MAI IN AND PT WILL HAVE OUT PT UROLOGY
APT, PT WILL GO HOME WITH MAI.

## 2020-11-22 NOTE — NUR
PT SITTING IN DINING. PT TOOK MEDS THIS AM WITHOUT ANY ISSUES. PT HAS MAI TO
DD WITH YELLOW URINE AND HAS SOME SEDIMENT IN THE TUBING. PT LUNGS CLEAR. PT
ORIENTED TO SELF AND HOSPITAL. PT USES WALKER FAITHFULY WITH STEADY GAIT. PT
HAS HEMMORROIDS. PT HAVING SOFT STOOLS, PT INCONT IN BRIEF WITH SOFT STOOL.

## 2020-11-23 NOTE — NUR
WENT IN TO GIVE PATIENT HER MORNING MED AND SHE WAS LAYING ON HER LEFT SIDE
WITH COVERS UP OVER HER SHOULDERS AND WAS SHAKING. SHE STATES SHE CAN'T STOP
SHAKING. SHE STATES SHE IS FEELING COLD. PT HAD 2 BLANKETS AND A SHEET ON. I
TURNED UP HER HEAT IN HER ROOM FROM 72 TO 76 AND PLACED ANOTHER COUPLE OF
BLANKETS ON HER. SHE WAS AFEBRILE AT 97.6. PATIENT STOPPED SHAKING WHEN GIVING
HER MORNING THYROID MED. PATIENT ASKED ME TO HOLD HER TIGHT SO SHE COULD STOP
SHAKING. COULD NOT GET HER TO ANSWER WHEN i ASKED HER IF SHE WAS FEELING
ANXIOUS ABOUT ANYTHING.  I RUBBED HER BACK AND SHOULDERS FOR A BIT AND SHE
FELL BACK TO SLEEP. EMPTIED PATIENT'S MAI CATHETER WITH 1250 OUT. PATIENT
RESTING AND BED IN LOW POSITION AND BED ALARM IS ON.

## 2020-11-23 NOTE — NUR
ACCEPTED CARE OF PT FROM 7P-7A SHIFT.PT IN BED BUT AROUSES WITHOUT DIFFICULTY.
UP IN HOLLIDAY WITH WALKER TO BREAKFAST. SMILES AT STAFF, QUIET AND POLIETE THIS
AM. A BIT RELUCTANT TO TAKE HER MEDS BUT DOES SO WHOLE WITH GENTLE
ENCOURAGEMENT. EATS POORLY AT AM MEAL. DRINKS ENSURE. TO BE DISCHARGED TODAY
AT 2PM TO [facility]. PT HAS A MAI TO DD WITH GERTRUDIS URINE WITH SOME
SEDIMENT NOTED.RAYMOND COLLIER CALLED AND MSG LEFT FOR HIM TO RETURN CALL TO
CONSENT TO DISCHARGE INSTRUCTIONS. PT TEMP AT THIS TIME IS 99F ORALLY.

## 2020-11-23 NOTE — NUR
PATIENT AWOKE WITH C/O ABDOMINAL PAIN, AND NAUSEA. MYLANTA 15CC PO GIVEN AND
ZOFRAN 4MG SL GIVEN AT 0345.  MOIST WARM PACK MADE OF TOWELS PLACED ON
PATIENT'S ABDOMEN WITH SOME RELIEF. STATES SHE THINKS SHE HAD BM IN BRIEF. PT
JUST HAD BM SMEAR THIS TIME. PT GOWN AND SHIRT CHANGED D/T WET FROM MOIST WARM
PACK. PATIENT REPOSTIONED IN BED AND IS RESTING NOW. BED IN LOW POSITION AND
BED ALARM IS ON.